# Patient Record
Sex: MALE | Race: WHITE | URBAN - METROPOLITAN AREA
[De-identification: names, ages, dates, MRNs, and addresses within clinical notes are randomized per-mention and may not be internally consistent; named-entity substitution may affect disease eponyms.]

---

## 2017-12-08 ENCOUNTER — EMERGENCY (EMERGENCY)
Facility: HOSPITAL | Age: 27
LOS: 1 days | Discharge: HOME | End: 2017-12-08
Attending: EMERGENCY MEDICINE | Admitting: EMERGENCY MEDICINE
Payer: MEDICARE

## 2017-12-08 VITALS
DIASTOLIC BLOOD PRESSURE: 78 MMHG | HEART RATE: 100 BPM | OXYGEN SATURATION: 99 % | SYSTOLIC BLOOD PRESSURE: 118 MMHG | RESPIRATION RATE: 18 BRPM | TEMPERATURE: 98 F

## 2017-12-08 DIAGNOSIS — S61.255A OPEN BITE OF LEFT RING FINGER WITHOUT DAMAGE TO NAIL, INITIAL ENCOUNTER: ICD-10-CM
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
## 2017-12-08 PROCEDURE — 99283 EMERGENCY DEPT VISIT LOW MDM: CPT | Mod: 25

## 2017-12-08 PROCEDURE — G0168: CPT

## 2017-12-08 RX ORDER — TETANUS TOXOID, REDUCED DIPHTHERIA TOXOID AND ACELLULAR PERTUSSIS VACCINE, ADSORBED 5; 2.5; 8; 8; 2.5 [IU]/.5ML; [IU]/.5ML; UG/.5ML; UG/.5ML; UG/.5ML
0.5 SUSPENSION INTRAMUSCULAR ONCE
Qty: 0 | Refills: 0 | Status: COMPLETED | OUTPATIENT
Start: 2017-12-08 | End: 2017-12-08

## 2017-12-09 PROCEDURE — 90471 IMMUNIZATION ADMIN: CPT

## 2017-12-09 PROCEDURE — 12001 RPR S/N/AX/GEN/TRNK 2.5CM/<: CPT

## 2017-12-09 PROCEDURE — 99283 EMERGENCY DEPT VISIT LOW MDM: CPT | Mod: 25

## 2017-12-09 PROCEDURE — 90715 TDAP VACCINE 7 YRS/> IM: CPT

## 2017-12-09 RX ADMIN — Medication 1 TABLET(S): at 00:30

## 2017-12-09 RX ADMIN — TETANUS TOXOID, REDUCED DIPHTHERIA TOXOID AND ACELLULAR PERTUSSIS VACCINE, ADSORBED 0.5 MILLILITER(S): 5; 2.5; 8; 8; 2.5 SUSPENSION INTRAMUSCULAR at 00:28

## 2017-12-09 NOTE — ED PROVIDER NOTE - MEDICAL DECISION MAKING DETAILS
no evidence of tendon injury on exam, no fb, superficial laceration can be repaired with dermabond, will attempt to use surgicel for bleeding from abrasion, tdap, abx, no indication for rabies shot as animal healthy and can be observed, instructed pt and family return precautions, low suspicion for fracture

## 2017-12-09 NOTE — ED PROCEDURE NOTE - PROCEDURE ADDITIONAL DETAILS
L 4th digit, dorsal aspect of 4th digit  avulsed area cleansed w/ water, and betadine, no fb noted, surgicel applied w/ kerlex for hemostasis

## 2017-12-09 NOTE — ED PROCEDURE NOTE - PROCEDURE ADDITIONAL DETAILS
L 4th digit, dog bite  0.4cm laceration to finger pad, superficial, dermabond w/ wound edges approximated, bleeding stopped

## 2017-12-09 NOTE — ED PROVIDER NOTE - OBJECTIVE STATEMENT
27 yom pw dog bite to L 4th digit.  own dog, not sick, vaccination up to date.  pt tdap not updated.  no other complaints.

## 2017-12-09 NOTE — ED PROVIDER NOTE - PHYSICAL EXAMINATION
CON: ao x 3, HENMT: clear oropharynx, soft neck, HEAD: atraumatic, SKIN: 0.4cm superficial laceration noted over L 4th finger pad, also abrasion noted next to the laceration w/ bleeding , MSK: full ROM of individual phalanges of 4th finger, no crepitus, no fb noted,

## 2017-12-27 RX ORDER — DIVALPROEX SODIUM 500 MG/1
0 TABLET, DELAYED RELEASE ORAL
Qty: 0 | Refills: 0 | COMMUNITY

## 2017-12-27 RX ORDER — FLUOXETINE HCL 10 MG
0 CAPSULE ORAL
Qty: 0 | Refills: 0 | COMMUNITY
